# Patient Record
(demographics unavailable — no encounter records)

---

## 2024-12-02 NOTE — PHYSICAL EXAM
[No Acute Distress] : no acute distress [Normal Sclera/Conjunctiva] : normal sclera/conjunctiva [No Proptosis] : no proptosis [Normal Outer Ear/Nose] : the ears and nose were normal in appearance [Thyroid Not Enlarged] : the thyroid was not enlarged [No Thyroid Nodules] : no palpable thyroid nodules [Clear to Auscultation] : lungs were clear to auscultation bilaterally [Soft] : abdomen soft [Spine Straight] : spine straight [No Stigmata of Cushings Syndrome] : no stigmata of Cushings Syndrome [Normal Gait] : normal gait [Vibration Dec.] : diminished vibratory sensation at the level of the toes [Normal Reflexes] : deep tendon reflexes were 2+ and symmetric [No Tremors] : no tremors [Oriented x3] : oriented to person, place, and time [Kyphosis] : no kyphosis present [Acanthosis Nigricans] : no acanthosis nigricans [de-identified] : Pitting edema 1+ in bilateral LE.

## 2024-12-02 NOTE — ASSESSMENT
[Carbohydrate Consistent Diet] : carbohydrate consistent diet [Importance of Diet and Exercise] : importance of diet and exercise to improve glycemic control, achieve weight loss and improve cardiovascular health [Exercise/Effect on Glucose] : exercise/effect on glucose [Self Monitoring of Blood Glucose] : self monitoring of blood glucose [Weight Loss] : weight loss [Diabetic Medications] : Risks and benefits of diabetic medications were discussed

## 2024-12-02 NOTE — HISTORY OF PRESENT ILLNESS
[FreeTextEntry1] : 76 y/o M pt, with h/o T2DM (dx in 8/2021), presents today for endocrine f/u. Home Glucose Monitoring: Never checks his BS.  Other PMHx: CHF (08/20/24), HTN, Hard of hearing, Cellulitis (8/2021), Angioplasty (~2014), Prostate CA (dx in ~2011). Denies Hx of MI.  PSHx: Coronary Bypass, Hernia repair FHx: DM (father), HTN (mother), Heart disease (father). No FHx of thyroid disorder.  SHx: Former smoker (quit in 2015). Social EtOH use. No recreational drug use.  Lifestyle: Active- walks 1.5 miles a day.  [facility] Cardiologist: Dr. wDayne Lorenzo  ([phone]). Last urologist visit: ~6/2021 Last podiatrist visit 1 week ago.  06/29/2023 Pt has POCT glucose 181, /79 and BMI 33.3. He lost 8 lbs in 7 months.  CC: "I'm very lethargic" Pt states that he does not sleep well and feels very fatigued. He had a prostate procedure that "did not go well". He has to get up to urinate 3-4 times a night. He states he is not taking his medication as often as she should.  He sees cardiologist regularly. He is scheduled to have a cardiac stress test.   11/20/2023 Pt has POCT 137, /89 and BMI 34.21. He gained 6 lbs in 5 months.  CC: "I have not been taking  Pt denies weight loss. He states he has not been taking medicines for DM for the past 2-3 months as he never received any medicines. He states he does not take his BS regularly. Pt reports he has fungal infections in his LE that he is going to a dermatologist for. As per pt, he is on Ab for these infections. Additionally, pt states he has cellulitis.    06/24/2024  Pt has POCT 170, /84 and BMI 34.21. No significant weight change. CC: "I'm doing alright." Pt recently followed up with his GI doctor for constipation. He has not visited a podiatrist this year.   08/20/2024  Pt did not have any questions prior to the initiation of the telehealth visit. He presents today accompanied by his wife. His wife states that his sugar today was 305 after a light yogurt for breakfast and it is currently 266. Pt was recently hospitalized due to high blood pressure and will follow up with a cardiologist later today. He was receiving several IV infusions while in the hospital and has undergone an angiogram due to heart failure in the past: arteries were clear. After discharge on Sunday, glucose was 150s. Last night sugar was 154, but pt did not check FBS.   12/02/2024  Pt has POCT 96, /83 and BMI 33.45. No significant weight change. CC: "I feel alright." Pt followed-up with his new cardiologist Dr. Olivarez in 08/2024 and underwent angiogram, which was clear. However, he had difficulty walking and was hospitalized. Pt is currently attending [facility] for physical exercise routines.   [Medications verified as per pt on 12/02/2024] Current Medications: Metformin ER 1 g bid, Farxiga 10 mg, Atorvastatin 40 mg qd (initiated 1018/21) Imdur 30 mg, Cardura (Doxazosin) 2 mg, Cozaar 50 mg, Lasix 40 mg, Aspirin 81 mg, steroid 0.5 mg, Metoprolol Succinate 200, Sacubitril-valsartan, Spironolactone 25 mg, Amlodipine Valsartan 5-160 mg, HCTZ 12.5 mg - Held: GLP-1 qw (initiated 10/18/21-02/14/22), Lopressor 50 mg, Procardia 60 mg, Synjardy 12.5-1000 mg bid Medication modified/added this visit: Initiate Mounjaro 2.5 mg qw

## 2024-12-02 NOTE — ADDENDUM
[FreeTextEntry1] : I, Alexis You act solely as a scribe for Dr. Stephen Mark on this date 12/02/2024

## 2024-12-02 NOTE — PHYSICAL EXAM
[No Acute Distress] : no acute distress [Normal Sclera/Conjunctiva] : normal sclera/conjunctiva [No Proptosis] : no proptosis [Normal Outer Ear/Nose] : the ears and nose were normal in appearance [Thyroid Not Enlarged] : the thyroid was not enlarged [No Thyroid Nodules] : no palpable thyroid nodules [Clear to Auscultation] : lungs were clear to auscultation bilaterally [Soft] : abdomen soft [Spine Straight] : spine straight [No Stigmata of Cushings Syndrome] : no stigmata of Cushings Syndrome [Normal Gait] : normal gait [Vibration Dec.] : diminished vibratory sensation at the level of the toes [Normal Reflexes] : deep tendon reflexes were 2+ and symmetric [No Tremors] : no tremors [Oriented x3] : oriented to person, place, and time [Kyphosis] : no kyphosis present [Acanthosis Nigricans] : no acanthosis nigricans [de-identified] : Pitting edema 1+ in bilateral LE.

## 2024-12-02 NOTE — HISTORY OF PRESENT ILLNESS
[FreeTextEntry1] : 76 y/o M pt, with h/o T2DM (dx in 8/2021), presents today for endocrine f/u. Home Glucose Monitoring: Never checks his BS.  Other PMHx: CHF (08/20/24), HTN, Hard of hearing, Cellulitis (8/2021), Angioplasty (~2014), Prostate CA (dx in ~2011). Denies Hx of MI.  PSHx: Coronary Bypass, Hernia repair FHx: DM (father), HTN (mother), Heart disease (father). No FHx of thyroid disorder.  SHx: Former smoker (quit in 2015). Social EtOH use. No recreational drug use.  Lifestyle: Active- walks 1.5 miles a day.  [facility] Cardiologist: Dr. Dwayne Lorenzo  ([phone]). Last urologist visit: ~6/2021 Last podiatrist visit 1 week ago.  06/29/2023 Pt has POCT glucose 181, /79 and BMI 33.3. He lost 8 lbs in 7 months.  CC: "I'm very lethargic" Pt states that he does not sleep well and feels very fatigued. He had a prostate procedure that "did not go well". He has to get up to urinate 3-4 times a night. He states he is not taking his medication as often as she should.  He sees cardiologist regularly. He is scheduled to have a cardiac stress test.   11/20/2023 Pt has POCT 137, /89 and BMI 34.21. He gained 6 lbs in 5 months.  CC: "I have not been taking  Pt denies weight loss. He states he has not been taking medicines for DM for the past 2-3 months as he never received any medicines. He states he does not take his BS regularly. Pt reports he has fungal infections in his LE that he is going to a dermatologist for. As per pt, he is on Ab for these infections. Additionally, pt states he has cellulitis.    06/24/2024  Pt has POCT 170, /84 and BMI 34.21. No significant weight change. CC: "I'm doing alright." Pt recently followed up with his GI doctor for constipation. He has not visited a podiatrist this year.   08/20/2024  Pt did not have any questions prior to the initiation of the telehealth visit. He presents today accompanied by his wife. His wife states that his sugar today was 305 after a light yogurt for breakfast and it is currently 266. Pt was recently hospitalized due to high blood pressure and will follow up with a cardiologist later today. He was receiving several IV infusions while in the hospital and has undergone an angiogram due to heart failure in the past: arteries were clear. After discharge on Sunday, glucose was 150s. Last night sugar was 154, but pt did not check FBS.   12/02/2024  Pt has POCT 96, /83 and BMI 33.45. No significant weight change. CC: "I feel alright." Pt followed-up with his new cardiologist Dr. Olivarez in 08/2024 and underwent angiogram, which was clear. However, he had difficulty walking and was hospitalized. Pt is currently attending [facility] for physical exercise routines.   [Medications verified as per pt on 12/02/2024] Current Medications: Metformin ER 1 g bid, Farxiga 10 mg, Atorvastatin 40 mg qd (initiated 1018/21) Imdur 30 mg, Cardura (Doxazosin) 2 mg, Cozaar 50 mg, Lasix 40 mg, Aspirin 81 mg, steroid 0.5 mg, Metoprolol Succinate 200, Sacubitril-valsartan, Spironolactone 25 mg, Amlodipine Valsartan 5-160 mg, HCTZ 12.5 mg - Held: GLP-1 qw (initiated 10/18/21-02/14/22), Lopressor 50 mg, Procardia 60 mg, Synjardy 12.5-1000 mg bid Medication modified/added this visit: Initiate Mounjaro 2.5 mg qw

## 2024-12-02 NOTE — END OF VISIT
[FreeTextEntry3] :  All medical record entries made by the Scribe were at my, Dr. Stephen Mark, direction and personally dictated by me on 12/02/2024. I have reviewed the chart and agree that the record accurately reflects my personal performance of the history, physical exam, assessment and plan. I have also personally directed, reviewed and agreed with the chart. [Time Spent: ___ minutes] : I have spent [unfilled] minutes of time on the encounter which excludes teaching and separately reported services.

## 2024-12-02 NOTE — DATA REVIEWED
[FreeTextEntry1] : Labs: \par  - 05/10/22: A1c 6.3% (H), LDL-c 76\par  - 09/23/21: microalbumin/creatinine ratio 32, HGB 12.8, HCT 39.9, HGB A1c 6.4%, , HDL-c 38, LDL-c 85, sodium 146, glucose 169, creatinine 1.18, eGFR 61\par  - 07/19/21: A1c 7.0%, s.creat 1.18, LDL-c 94, , Alb/Creat 54

## 2024-12-02 NOTE — THERAPY
[Today's Date] : [unfilled] [FreeTextEntry7] : Synjardy 12.5-1000 mg bid, Jardiance 10 mg, Ozempic 0.5 mg,  Atorvastatin 20 mg

## 2025-01-29 NOTE — REVIEW OF SYSTEMS
Anesthesia Evaluation     Patient summary reviewed   no history of anesthetic complications:  NPO Solid Status: > 8 hours       Airway   Mallampati: II  TM distance: >3 FB  Neck ROM: full  Dental      Pulmonary    (+) a smoker Former,   (-) shortness of breath, sleep apnea  Cardiovascular   Exercise tolerance: good (4-7 METS)    (-) hypertension, past MI, cardiac stents      Neuro/Psych  (-) seizures, TIA, CVA  GI/Hepatic/Renal/Endo    (+)  hiatal hernia, GERD, liver disease,     ROS Comment: Esophageal stricture      Colon cancer with mets to liver    Musculoskeletal     Abdominal    Substance History      OB/GYN          Other      history of cancer active                                      Anesthesia Plan    ASA 3     general   total IV anesthesia  intravenous induction   Anesthetic plan and risks discussed with patient.       [Negative] : Heme/Lymph

## 2025-01-29 NOTE — PHYSICAL EXAM
[General Appearance - Well Developed] : well developed [Normal Appearance] : normal appearance [Well Groomed] : well groomed [General Appearance - Well Nourished] : well nourished [No Deformities] : no deformities [General Appearance - In No Acute Distress] : no acute distress [Normal Conjunctiva] : the conjunctiva exhibited no abnormalities [Eyelids - No Xanthelasma] : the eyelids demonstrated no xanthelasmas [Normal Oral Mucosa] : normal oral mucosa [No Oral Pallor] : no oral pallor [No Oral Cyanosis] : no oral cyanosis [Normal Jugular Venous A Waves Present] : normal jugular venous A waves present [Normal Jugular Venous V Waves Present] : normal jugular venous V waves present [No Jugular Venous Levy A Waves] : no jugular venous levy A waves [Respiration, Rhythm And Depth] : normal respiratory rhythm and effort [Exaggerated Use Of Accessory Muscles For Inspiration] : no accessory muscle use [Auscultation Breath Sounds / Voice Sounds] : lungs were clear to auscultation bilaterally [Heart Rate And Rhythm] : heart rate and rhythm were normal [Heart Sounds] : normal S1 and S2 [Abdomen Soft] : soft [Abdomen Tenderness] : non-tender [Abdomen Mass (___ Cm)] : no abdominal mass palpated [Abnormal Walk] : normal gait [Gait - Sufficient For Exercise Testing] : the gait was sufficient for exercise testing [Nail Clubbing] : no clubbing of the fingernails [Cyanosis, Localized] : no localized cyanosis [Petechial Hemorrhages (___cm)] : no petechial hemorrhages [Skin Color & Pigmentation] : normal skin color and pigmentation [] : no rash [No Venous Stasis] : no venous stasis [Skin Lesions] : no skin lesions [No Skin Ulcers] : no skin ulcer [No Xanthoma] : no  xanthoma was observed [Oriented To Time, Place, And Person] : oriented to person, place, and time [Affect] : the affect was normal [Mood] : the mood was normal [No Anxiety] : not feeling anxious

## 2025-02-03 NOTE — DISCUSSION/SUMMARY
[Procedure Low Risk] : the procedure risk is low [Patient Intermediate Risk] : the patient is an intermediate risk [Optimized for Surgery] : the patient is optimized for surgery [As per surgery] : as per surgery [Continue] : Continue medications as currently directed [FreeTextEntry1] : Maintain a low caloric, low sodium, low cholesterol diet. Dietary counseling given, diet and exercise discussed in detail.

## 2025-02-03 NOTE — HISTORY OF PRESENT ILLNESS
[Preoperative Visit] : for a medical evaluation prior to surgery [Scheduled Procedure ___] : a [unfilled] [Date of Surgery ___] : on [unfilled] [Surgeon Name ___] : surgeon: [unfilled] [Good] : Good [Diabetes] : diabetes [Cardiovascular Disease] : cardiovascular disease [Anti-Platelet Agents] : anti-platelet agents [Fever] : no fever [Chills] : no chills [Fatigue] : no fatigue [Chest Pain] : no chest pain [Cough] : no cough [Dyspnea] : no dyspnea [Dysuria] : no dysuria [Urinary Frequency] : no urinary frequency [Nausea] : no nausea [Vomiting] : no vomiting [Diarrhea] : no diarrhea [Abdominal Pain] : no abdominal pain [Easy Bruising] : no easy bruising [Lower Extremity Swelling] : no lower extremity swelling [Poor Exercise Tolerance] : no poor exercise tolerance [Pulmonary Disease] : no pulmonary disease [Nicotine Dependence] : no nicotine dependence [Renal Disease] : no renal disease [GI Disease] : no gastrointestinal disease [Sleep Apnea] : no sleep apnea [Thromboembolic Problems] : no thromboembolic problems [Clotting Disorder] : no clotting disorder [Bleeding Disorder] : no bleeding disorder [Transfusion Reaction] : no transfusion reaction [Impaired Immunity] : no impaired immunity [Prior Anesthesia] : No prior anesthesia [Prev Anesthesia Reaction] : no previous anesthesia reaction [FreeTextEntry1] :  Denies Chest Pain, SOB, Dizziness, Leg Edema, Orthopnea, PND, Palpitations, Syncope, TALAVERA, Diaphoresis

## 2025-02-05 NOTE — ADDENDUM
[FreeTextEntry1] : I, Alexis You act solely as a scribe for Dr. Stephen Mark on this date 02/04/2025

## 2025-02-05 NOTE — HISTORY OF PRESENT ILLNESS
[FreeTextEntry1] : 77 y/o M pt, with h/o T2DM (dx in 8/2021), presents today for endocrine f/u. Home Glucose Monitoring: Never checks his BS.  Other PMHx: CHF (08/20/24), HTN, Hard of hearing, Cellulitis (8/2021), Angioplasty (~2014), Prostate CA (dx in ~2011). Denies Hx of MI.  PSHx: Coronary Bypass, Hernia repair FHx: DM (father), HTN (mother), Heart disease (father). No FHx of thyroid disorder.  SHx: Former smoker (quit in 2015). Social EtOH use. No recreational drug use.  Lifestyle: Active- walks 1.5 miles a day.  [facility] Cardiologist: Dr. Dwayne Lorenzo  ([phone]). Last urologist visit: ~6/2021 Last podiatrist visit 1 week ago.  06/29/2023 Pt has POCT glucose 181, /79 and BMI 33.3. He lost 8 lbs in 7 months.  CC: "I'm very lethargic" Pt states that he does not sleep well and feels very fatigued. He had a prostate procedure that "did not go well". He has to get up to urinate 3-4 times a night. He states he is not taking his medication as often as she should.  He sees cardiologist regularly. He is scheduled to have a cardiac stress test.   11/20/2023 Pt has POCT 137, /89 and BMI 34.21. He gained 6 lbs in 5 months.  CC: "I have not been taking  Pt denies weight loss. He states he has not been taking medicines for DM for the past 2-3 months as he never received any medicines. He states he does not take his BS regularly. Pt reports he has fungal infections in his LE that he is going to a dermatologist for. As per pt, he is on Ab for these infections. Additionally, pt states he has cellulitis.    06/24/2024  Pt has POCT 170, /84 and BMI 34.21. No significant weight change. CC: "I'm doing alright." Pt recently followed up with his GI doctor for constipation. He has not visited a podiatrist this year.   08/20/2024  Pt did not have any questions prior to the initiation of the telehealth visit. He presents today accompanied by his wife. His wife states that his sugar today was 305 after a light yogurt for breakfast and it is currently 266. Pt was recently hospitalized due to high blood pressure and will follow up with a cardiologist later today. He was receiving several IV infusions while in the hospital and has undergone an angiogram due to heart failure in the past: arteries were clear. After discharge on Sunday, glucose was 150s. Last night sugar was 154, but pt did not check FBS.   12/02/2024  Pt has POCT 96, /83 and BMI 33.45. No significant weight change. CC: "I feel alright." Pt followed-up with his new cardiologist Dr. Olivarez in 08/2024 and underwent angiogram, which was clear. However, he had difficulty walking and was hospitalized. Pt is currently attending [facility] for physical exercise routines.   02/04/2025  Pt has POCT 114, /81 and BMI 33.76. No significant weight change. CC: "I feel alright." Pt attempted Mounjaro 2 months ago but experienced severe diarrhea and burping and discontinued it. He is scheduled for colonoscopy on 03/06/25 and was instructed to discontinue Farxiga 3 days prior.  - 01/29/25 A1c 6.8%, LDL-c 65, vitamin D 19.4  [Medications verified as per pt on 02/04/2025] Current Medications: Metformin ER 1 g bid, Farxiga 10 mg, Atorvastatin 40 mg qd (initiated 1018/21) Imdur 30 mg, Cardura (Doxazosin) 2 mg, Cozaar 50 mg, Lasix 40 mg, Aspirin 81 mg, steroid 0.5 mg, Metoprolol Succinate 200, Sacubitril-valsartan, Spironolactone 25 mg, Amlodipine Valsartan 5-160 mg, HCTZ 12.5 mg - Held: GLP-1 qw (initiated 10/18/21-02/14/22), Lopressor 50 mg, Procardia 60 mg, Synjardy 12.5-1000 mg bid, Mounjaro 2.5 Medication modified/added this visit: Ergocalciferol 1.25 mg qw for 3 months

## 2025-02-05 NOTE — PHYSICAL EXAM
[No Acute Distress] : no acute distress [Normal Sclera/Conjunctiva] : normal sclera/conjunctiva [No Proptosis] : no proptosis [Normal Outer Ear/Nose] : the ears and nose were normal in appearance [Thyroid Not Enlarged] : the thyroid was not enlarged [No Thyroid Nodules] : no palpable thyroid nodules [Clear to Auscultation] : lungs were clear to auscultation bilaterally [Soft] : abdomen soft [Spine Straight] : spine straight [No Stigmata of Cushings Syndrome] : no stigmata of Cushings Syndrome [Normal Gait] : normal gait [Vibration Dec.] : diminished vibratory sensation at the level of the toes [Normal Reflexes] : deep tendon reflexes were 2+ and symmetric [No Tremors] : no tremors [Oriented x3] : oriented to person, place, and time [Kyphosis] : no kyphosis present [Acanthosis Nigricans] : no acanthosis nigricans [de-identified] : Pitting edema 1+ in bilateral LE.

## 2025-02-05 NOTE — END OF VISIT
[FreeTextEntry3] :  All medical record entries made by the Scribe were at my, Dr. Stephen Mark, direction and personally dictated by me on 02/04/2025. I have reviewed the chart and agree that the record accurately reflects my personal performance of the history, physical exam, assessment and plan. I have also personally directed, reviewed and agreed with the chart. [Time Spent: ___ minutes] : I have spent [unfilled] minutes of time on the encounter which excludes teaching and separately reported services.

## 2025-02-05 NOTE — PHYSICAL EXAM
[No Acute Distress] : no acute distress [Normal Sclera/Conjunctiva] : normal sclera/conjunctiva [No Proptosis] : no proptosis [Normal Outer Ear/Nose] : the ears and nose were normal in appearance [Thyroid Not Enlarged] : the thyroid was not enlarged [No Thyroid Nodules] : no palpable thyroid nodules [Clear to Auscultation] : lungs were clear to auscultation bilaterally [Soft] : abdomen soft [Spine Straight] : spine straight [No Stigmata of Cushings Syndrome] : no stigmata of Cushings Syndrome [Normal Gait] : normal gait [Vibration Dec.] : diminished vibratory sensation at the level of the toes [Normal Reflexes] : deep tendon reflexes were 2+ and symmetric [No Tremors] : no tremors [Oriented x3] : oriented to person, place, and time [Kyphosis] : no kyphosis present [Acanthosis Nigricans] : no acanthosis nigricans [de-identified] : Pitting edema 1+ in bilateral LE.

## 2025-02-05 NOTE — HISTORY OF PRESENT ILLNESS
[FreeTextEntry1] : 75 y/o M pt, with h/o T2DM (dx in 8/2021), presents today for endocrine f/u. Home Glucose Monitoring: Never checks his BS.  Other PMHx: CHF (08/20/24), HTN, Hard of hearing, Cellulitis (8/2021), Angioplasty (~2014), Prostate CA (dx in ~2011). Denies Hx of MI.  PSHx: Coronary Bypass, Hernia repair FHx: DM (father), HTN (mother), Heart disease (father). No FHx of thyroid disorder.  SHx: Former smoker (quit in 2015). Social EtOH use. No recreational drug use.  Lifestyle: Active- walks 1.5 miles a day.  [facility] Cardiologist: Dr. Dwayne Lorenzo  ([phone]). Last urologist visit: ~6/2021 Last podiatrist visit 1 week ago.  06/29/2023 Pt has POCT glucose 181, /79 and BMI 33.3. He lost 8 lbs in 7 months.  CC: "I'm very lethargic" Pt states that he does not sleep well and feels very fatigued. He had a prostate procedure that "did not go well". He has to get up to urinate 3-4 times a night. He states he is not taking his medication as often as she should.  He sees cardiologist regularly. He is scheduled to have a cardiac stress test.   11/20/2023 Pt has POCT 137, /89 and BMI 34.21. He gained 6 lbs in 5 months.  CC: "I have not been taking  Pt denies weight loss. He states he has not been taking medicines for DM for the past 2-3 months as he never received any medicines. He states he does not take his BS regularly. Pt reports he has fungal infections in his LE that he is going to a dermatologist for. As per pt, he is on Ab for these infections. Additionally, pt states he has cellulitis.    06/24/2024  Pt has POCT 170, /84 and BMI 34.21. No significant weight change. CC: "I'm doing alright." Pt recently followed up with his GI doctor for constipation. He has not visited a podiatrist this year.   08/20/2024  Pt did not have any questions prior to the initiation of the telehealth visit. He presents today accompanied by his wife. His wife states that his sugar today was 305 after a light yogurt for breakfast and it is currently 266. Pt was recently hospitalized due to high blood pressure and will follow up with a cardiologist later today. He was receiving several IV infusions while in the hospital and has undergone an angiogram due to heart failure in the past: arteries were clear. After discharge on Sunday, glucose was 150s. Last night sugar was 154, but pt did not check FBS.   12/02/2024  Pt has POCT 96, /83 and BMI 33.45. No significant weight change. CC: "I feel alright." Pt followed-up with his new cardiologist Dr. Olivarez in 08/2024 and underwent angiogram, which was clear. However, he had difficulty walking and was hospitalized. Pt is currently attending [facility] for physical exercise routines.   02/04/2025  Pt has POCT 114, /81 and BMI 33.76. No significant weight change. CC: "I feel alright." Pt attempted Mounjaro 2 months ago but experienced severe diarrhea and burping and discontinued it. He is scheduled for colonoscopy on 03/06/25 and was instructed to discontinue Farxiga 3 days prior.  - 01/29/25 A1c 6.8%, LDL-c 65, vitamin D 19.4  [Medications verified as per pt on 02/04/2025] Current Medications: Metformin ER 1 g bid, Farxiga 10 mg, Atorvastatin 40 mg qd (initiated 1018/21) Imdur 30 mg, Cardura (Doxazosin) 2 mg, Cozaar 50 mg, Lasix 40 mg, Aspirin 81 mg, steroid 0.5 mg, Metoprolol Succinate 200, Sacubitril-valsartan, Spironolactone 25 mg, Amlodipine Valsartan 5-160 mg, HCTZ 12.5 mg - Held: GLP-1 qw (initiated 10/18/21-02/14/22), Lopressor 50 mg, Procardia 60 mg, Synjardy 12.5-1000 mg bid, Mounjaro 2.5 Medication modified/added this visit: Ergocalciferol 1.25 mg qw for 3 months